# Patient Record
Sex: FEMALE | Race: BLACK OR AFRICAN AMERICAN
[De-identification: names, ages, dates, MRNs, and addresses within clinical notes are randomized per-mention and may not be internally consistent; named-entity substitution may affect disease eponyms.]

---

## 2020-09-28 ENCOUNTER — DASHBOARD ENCOUNTERS (OUTPATIENT)
Age: 50
End: 2020-09-28

## 2020-09-28 ENCOUNTER — OFFICE VISIT (OUTPATIENT)
Dept: URBAN - METROPOLITAN AREA CLINIC 82 | Facility: CLINIC | Age: 50
End: 2020-09-28
Payer: COMMERCIAL

## 2020-09-28 VITALS
WEIGHT: 236 LBS | HEART RATE: 71 BPM | RESPIRATION RATE: 16 BRPM | SYSTOLIC BLOOD PRESSURE: 119 MMHG | DIASTOLIC BLOOD PRESSURE: 76 MMHG | TEMPERATURE: 97.9 F | BODY MASS INDEX: 37.04 KG/M2 | HEIGHT: 67 IN

## 2020-09-28 DIAGNOSIS — N18.6 ESRD (END STAGE RENAL DISEASE): ICD-10-CM

## 2020-09-28 DIAGNOSIS — K52.9 CHRONIC DIARRHEA: ICD-10-CM

## 2020-09-28 PROCEDURE — G9903 PT SCRN TBCO ID AS NON USER: HCPCS | Performed by: INTERNAL MEDICINE

## 2020-09-28 PROCEDURE — 3017F COLORECTAL CA SCREEN DOC REV: CPT | Performed by: INTERNAL MEDICINE

## 2020-09-28 PROCEDURE — 99204 OFFICE O/P NEW MOD 45 MIN: CPT | Performed by: INTERNAL MEDICINE

## 2020-09-28 PROCEDURE — G8427 DOCREV CUR MEDS BY ELIG CLIN: HCPCS | Performed by: INTERNAL MEDICINE

## 2020-09-28 RX ORDER — LABETALOL HYDROCHLORIDE 200 MG/1
1 TABLET TABLET ORAL TWICE A DAY
Status: ACTIVE | COMMUNITY

## 2020-09-28 NOTE — HPI-TODAY'S VISIT:
chr diarrhea, urgency after eating, no blood, every couple days, no abd pain, esrd, reports colonoscopy 10/2019 told to repeat in 10 years, no fhx crc, kidney transplant list

## 2020-09-30 LAB
A/G RATIO: 1.2
ALBUMIN: 3.9
ALKALINE PHOSPHATASE: 68
ALT (SGPT): 12
AST (SGOT): 17
BASO (ABSOLUTE): 0
BASOS: 0
BILIRUBIN, TOTAL: 0.5
BUN/CREATININE RATIO: 3
BUN: 57
CALCIUM: 8
CARBON DIOXIDE, TOTAL: 22
CHLORIDE: 97
CREATININE: 16.34
EGFR IF AFRICN AM: 3
EGFR IF NONAFRICN AM: 2
ENDOMYSIAL ANTIBODY IGA: NEGATIVE
EOS (ABSOLUTE): 0
EOS: 0
GLOBULIN, TOTAL: 3.2
GLUCOSE: 127
HEMATOCRIT: 23.9
HEMATOLOGY COMMENTS:: (no result)
HEMOGLOBIN: 7.4
IMMATURE CELLS: (no result)
IMMATURE GRANS (ABS): 0
IMMATURE GRANULOCYTES: 0
IMMUNOGLOBULIN A, QN, SERUM: 481
LYMPHS (ABSOLUTE): 0.9
LYMPHS: 19
MCH: 23.5
MCHC: 31
MCV: 76
MONOCYTES(ABSOLUTE): 0.4
MONOCYTES: 8
NEUTROPHILS (ABSOLUTE): 3.2
NEUTROPHILS: 73
NRBC: (no result)
PLATELETS: 160
POTASSIUM: 3.6
PROTEIN, TOTAL: 7.1
RBC: 3.15
RDW: 18.3
SODIUM: 139
T-TRANSGLUTAMINASE (TTG) IGA: <2
T4,FREE(DIRECT): 1.37
TSH: 4.93
WBC: 4.5

## 2020-10-05 ENCOUNTER — LAB OUTSIDE AN ENCOUNTER (OUTPATIENT)
Dept: URBAN - METROPOLITAN AREA CLINIC 115 | Facility: CLINIC | Age: 50
End: 2020-10-05

## 2020-10-10 LAB — GASTROINTESTINAL PATHOGEN: (no result)
